# Patient Record
Sex: FEMALE | Race: BLACK OR AFRICAN AMERICAN | Employment: PART TIME | URBAN - METROPOLITAN AREA
[De-identification: names, ages, dates, MRNs, and addresses within clinical notes are randomized per-mention and may not be internally consistent; named-entity substitution may affect disease eponyms.]

---

## 2019-06-05 ENCOUNTER — OFFICE VISIT (OUTPATIENT)
Dept: FAMILY MEDICINE | Facility: CLINIC | Age: 44
End: 2019-06-05
Payer: COMMERCIAL

## 2019-06-05 VITALS
HEART RATE: 79 BPM | TEMPERATURE: 98.3 F | DIASTOLIC BLOOD PRESSURE: 68 MMHG | OXYGEN SATURATION: 98 % | WEIGHT: 192.6 LBS | BODY MASS INDEX: 32.09 KG/M2 | HEIGHT: 65 IN | SYSTOLIC BLOOD PRESSURE: 103 MMHG | RESPIRATION RATE: 16 BRPM

## 2019-06-05 DIAGNOSIS — N91.2 AMENORRHEA: Primary | ICD-10-CM

## 2019-06-05 RX ORDER — LEVOTHYROXINE SODIUM 25 UG/1
25 TABLET ORAL DAILY
COMMUNITY

## 2019-06-05 ASSESSMENT — MIFFLIN-ST. JEOR: SCORE: 1519.51

## 2019-06-05 NOTE — PATIENT INSTRUCTIONS
No periods for 2 to 3 yeas   Send you to women doctor   probably have menopause   follow after OB/GYN        OB/GYN REFERRAL  [facility]  Phone: [phone]  Fax: [phone]      [facility]  [address], [address]  [place] [address]      Appointment:Wednesday June 26, 2019     Arrival Time: 9:15 am  Provider:  Dr. Uriarte     Please bring a copy of your insurance card and photo ID    If you cannot make this appointment please call [phone] to reschedule    **HOLD FOR COMPLETED OV NOTE** Referral, demographics, labs and office note faxed to [phone].

## 2019-06-05 NOTE — PROGRESS NOTES
"S: Alejo Gonzalez is a 44 year old female who  States that had no periods fro 2 to 3 years/ sexually active without protection   No previus contraception   Last vaginal delivery 3  years ago   Last PAP/Normal by her report    Patients states that main concern today is referral to OB/GYN-amenorhea    PMHX/PSHX/MEDS/ALLERGIES/SHX/FHX reviewed and updated in Epic.  Blocked fallopian tube    ROS:  General: No fevers, chills  Head: No headache  Ears: No acute change in hearing.    CV: No chest pain or palpitations.  Resp: No shortness of breath.  No cough. No hemoptysis.  GI: No nausea, vomiting, constipation, diarrhea  : No urinary pains    O: /68   Pulse 79   Temp 98.3  F (36.8  C) (Oral)   Resp 16   Ht 1.651 m (5' 5\")   Wt 87.4 kg (192 lb 9.6 oz)   SpO2 98%   BMI 32.05 kg/m     Gen:  Well nourished and in NAD  HEENT: PERRLA; TMs normal color and landmarks; nasopharynx pink and moist; oropharynx pink and moist  Neck: supple without lymphadenopathy  CV:  RRR  - no murmurs, rubs, or gallups,   Pulm:  CTAB, no wheezes/rales/rhonchi, good air entry   ABD: soft, nontender, no masses, no rebound, BS intact throughout  Extrem: no cyanosis, edema or clubbing  Psych: Euthymic        Amenorrhea/mesntrual changes: probably menopause   Declines blood testing for menopuase or pregnancy detection today   OB/Gyn   Get  Medical records/was in [place] and now back in [place]    RTC after referralor sooner if develops new or worsening symptoms.    Oswaldo Onofre      "

## 2019-06-08 ENCOUNTER — HOSPITAL ENCOUNTER (EMERGENCY)
Facility: CLINIC | Age: 44
Discharge: HOME | End: 2019-06-08
Attending: FAMILY MEDICINE | Admitting: FAMILY MEDICINE
Payer: COMMERCIAL

## 2019-06-08 VITALS
RESPIRATION RATE: 14 BRPM | WEIGHT: 194 LBS | TEMPERATURE: 99 F | OXYGEN SATURATION: 95 % | DIASTOLIC BLOOD PRESSURE: 67 MMHG | BODY MASS INDEX: 32.28 KG/M2 | SYSTOLIC BLOOD PRESSURE: 111 MMHG | HEART RATE: 84 BPM

## 2019-06-08 DIAGNOSIS — J02.0 STREPTOCOCCAL SORE THROAT: ICD-10-CM

## 2019-06-08 LAB
DEPRECATED S PYO AG THROAT QL EIA: ABNORMAL
SPECIMEN SOURCE: ABNORMAL

## 2019-06-08 PROCEDURE — 87880 STREP A ASSAY W/OPTIC: CPT | Performed by: FAMILY MEDICINE

## 2019-06-08 PROCEDURE — 99284 EMERGENCY DEPT VISIT MOD MDM: CPT | Mod: 25 | Performed by: FAMILY MEDICINE

## 2019-06-08 PROCEDURE — 25000132 ZZH RX MED GY IP 250 OP 250 PS 637: Performed by: FAMILY MEDICINE

## 2019-06-08 PROCEDURE — 25000128 H RX IP 250 OP 636: Performed by: FAMILY MEDICINE

## 2019-06-08 PROCEDURE — 96372 THER/PROPH/DIAG INJ SC/IM: CPT | Performed by: FAMILY MEDICINE

## 2019-06-08 RX ORDER — IBUPROFEN 100 MG/5ML
600 SUSPENSION, ORAL (FINAL DOSE FORM) ORAL EVERY 6 HOURS PRN
Qty: 120 ML | Refills: 0 | Status: SHIPPED | OUTPATIENT
Start: 2019-06-08 | End: 2019-06-24

## 2019-06-08 RX ORDER — IBUPROFEN 100 MG/5ML
600 SUSPENSION, ORAL (FINAL DOSE FORM) ORAL ONCE
Status: COMPLETED | OUTPATIENT
Start: 2019-06-08 | End: 2019-06-08

## 2019-06-08 RX ORDER — IBUPROFEN 600 MG/1
600 TABLET, FILM COATED ORAL ONCE
Status: DISCONTINUED | OUTPATIENT
Start: 2019-06-08 | End: 2019-06-08

## 2019-06-08 RX ADMIN — PENICILLIN G BENZATHINE 2.4 MILLION UNITS: 2400000 INJECTION, SUSPENSION INTRAMUSCULAR at 15:07

## 2019-06-08 RX ADMIN — IBUPROFEN 600 MG: 100 SUSPENSION ORAL at 15:14

## 2019-06-08 ASSESSMENT — ENCOUNTER SYMPTOMS
DIAPHORESIS: 1
COUGH: 1
MYALGIAS: 1
VOMITING: 0
NAUSEA: 0
DIZZINESS: 1
TROUBLE SWALLOWING: 1
FEVER: 1
SHORTNESS OF BREATH: 0

## 2019-06-08 NOTE — DISCHARGE INSTRUCTIONS
Thank you for choosing [facility].     Please closely monitor for further symptoms. Return to the Emergency Department if you develop any new or worsening signs or symptoms.    If you received any opiate pain medications or sedatives during your visit, please do not drive for at least 8 hours.     Labs, cultures or final xray interpretations may still need to be reviewed.  We will call you if your plan of care needs to be changed.    Please follow up with your primary care physician or clinic.

## 2019-06-08 NOTE — ED AVS SNAPSHOT
[facility], [place], Emergency Department  [address]  [facility] [address]  Phone:  [phone]  Fax:  [phone]                                    Alejo Gonzalez   MRN: [id number]    Department:  [facility], Emergency Department   Date of Visit:  6/8/2019           After Visit Summary Signature Page    I have received my discharge instructions, and my questions have been answered. I have discussed any challenges I see with this plan with the nurse or doctor.    ..........................................................................................................................................  Patient/Patient Representative Signature      ..........................................................................................................................................  Patient Representative Print Name and Relationship to Patient    ..................................................               ................................................  Date                                   Time    ..........................................................................................................................................  Reviewed by Signature/Title    ...................................................              ..............................................  Date                                               Time          22EPIC Rev 08/18

## 2019-06-08 NOTE — ED PROVIDER NOTES
History     Chief Complaint   Patient presents with     Pharyngitis     ill since last jacky with rt ear and right side of throat pain     HPI  Alejo Gonzalez is a 44 year old female who presents to the ED for throat pain and difficulty swallowing. She began experiencing throat pain and difficulty swallowing at 9AM this morning (06/08/2019). She endorses a fever (at home, afebrile in the ED), dizziness, and sweating. She also endorses a cough, whole-body myalgias, and right ear discomfort. She attempted to alleviate her fever using Tylenol. Patient states her son has recently been sick with strep throat and a cough.      I have reviewed the Medications, Allergies, Past Medical and Surgical History, and Social History in the Epic system.    Review of Systems   Constitutional: Positive for diaphoresis and fever (at home, afebrile in the ED).   HENT: Positive for ear pain (discomfort) and trouble swallowing.    Respiratory: Positive for cough. Negative for shortness of breath.    Gastrointestinal: Negative for nausea and vomiting.   Musculoskeletal: Positive for myalgias (whole-body).   Neurological: Positive for dizziness.       Physical Exam   BP: 111/67  Pulse: 90  Temp: 98.2  F (36.8  C)  Resp: 16  Weight: 88 kg (194 lb)  SpO2: 94 %      Physical Exam   Constitutional: No distress.   HENT:   Head: Atraumatic.   Mouth/Throat: Uvula is midline and mucous membranes are normal. Oropharyngeal exudate, posterior oropharyngeal edema and posterior oropharyngeal erythema present. No tonsillar abscesses.   Eyes: Pupils are equal, round, and reactive to light. No scleral icterus.   Cardiovascular: Normal heart sounds and intact distal pulses.   Pulmonary/Chest: Breath sounds normal. No respiratory distress.   Abdominal: Soft. Bowel sounds are normal. There is no tenderness.   Musculoskeletal: She exhibits no edema or tenderness.   Lymphadenopathy:     She has cervical adenopathy (Anterior cervical chain).   Skin: Skin is  warm. No rash noted. She is not diaphoretic.       ED Course        Procedures             Critical Care time:  none             Labs Ordered and Resulted from Time of ED Arrival Up to the Time of Departure from the ED   RAPID STREP SCREEN - Abnormal; Notable for the following components:       Result Value    Rapid Strep A Screen   (*)     Value: POSITIVE: Group A Streptococcal antigen detected by immunoassay.    All other components within normal limits            Assessments & Plan (with Medical Decision Making)   Previously healthy 44-year-old woman presenting with 2-day history of severe sore throat fever and generalized malaise.  Initial differential diagnosis includes tonsillo-pharyngitis due to group A beta-hemolytic strep or other bacterial etiology, infectious mononucleosis, other viral pharyngitis, peritonsillar abscess, retropharyngeal abscess, Fady's angina, epiglottitis, thyroiditis, less likely meningitis, referred pain.  On exam, patient is afebrile, essentially normal vital signs.  The patient has evidence of pharyngitis.  There is no sign of Isis-tonsillar abscess, Retropharyngeal abscess, Fady's Angina, Epiglottitis, thyroid tenderness or Meningitis.  There is anterior cervical chain lymphadenopathy which is symmetric.  The remainder of her physical exam is unremarkable.  Her rapid strep test is positive.  She was able to take oral fluids in the ED as well as oral ibuprofen.  She received intramuscular benzathine penicillin.  Based on the clinical findings and the entire clinical scenario, the patient appears stable at this time to be treated symptomatically, and to follow-up as an outpatient for any further evaluation and treatment.  Discussed expected course, need for follow up, and indications for return with the patient.  See discharge instructions.      I have reviewed the nursing notes.    I have reviewed the findings, diagnosis, plan and need for follow up with the patient.        Medication List      Started    Benzocaine-Menthol 10-2.1 MG Lozg  1 lozenge, Mouth/Throat, 4 TIMES DAILY PRN     ibuprofen 100 MG/5ML suspension  Commonly known as:  ADVIL/MOTRIN  600 mg, Oral, EVERY 6 HOURS PRN            Final diagnoses:   Streptococcal sore throat     Gabriel PROCTOR, am serving as a trained medical scribe to document services personally performed by Ron Arroyo MD, based on the provider's statements to me.      IRon MD, was physically present and have reviewed and verified the accuracy of this note documented by Gabriel Paz.     6/8/2019   [facility], [place], EMERGENCY DEPARTMENT     Ron Arroyo MD  06/08/19 1350

## 2019-06-24 ENCOUNTER — OFFICE VISIT (OUTPATIENT)
Dept: FAMILY MEDICINE | Facility: CLINIC | Age: 44
End: 2019-06-24
Payer: COMMERCIAL

## 2019-06-24 VITALS
WEIGHT: 195 LBS | TEMPERATURE: 97.7 F | DIASTOLIC BLOOD PRESSURE: 73 MMHG | OXYGEN SATURATION: 98 % | HEART RATE: 71 BPM | SYSTOLIC BLOOD PRESSURE: 117 MMHG | RESPIRATION RATE: 16 BRPM | BODY MASS INDEX: 32.45 KG/M2

## 2019-06-24 DIAGNOSIS — R60.0 BILATERAL LEG EDEMA: Primary | ICD-10-CM

## 2019-06-24 DIAGNOSIS — B07.0 PLANTAR WARTS: ICD-10-CM

## 2019-06-24 DIAGNOSIS — R59.1 LA (LYMPHADENOPATHY): ICD-10-CM

## 2019-06-24 LAB
% GRANULOCYTES: 47.3 %G (ref 40–75)
ALBUMIN SERPL-MCNC: 4.4 MG/DL (ref 3.9–5.1)
ALP SERPL-CCNC: 81.3 U/L (ref 40–150)
ALT SERPL-CCNC: <15 U/L (ref 0–45)
AST SERPL-CCNC: 14 U/L (ref 0–45)
BILIRUB SERPL-MCNC: 0.3 MG/DL (ref 0.2–1.3)
BILIRUBIN UR: NEGATIVE
BLOOD UR: NEGATIVE
BUN SERPL-MCNC: 7.6 MG/DL (ref 7–19)
CALCIUM SERPL-MCNC: 9.2 MG/DL (ref 8.5–10.1)
CHLORIDE SERPLBLD-SCNC: 103.2 MMOL/L (ref 98–110)
CO2 SERPL-SCNC: 29.2 MMOL/L (ref 20–32)
CREAT SERPL-MCNC: 0.5 MG/DL (ref 0.5–1)
GFR SERPL CREATININE-BSD FRML MDRD: >90 ML/MIN/1.7 M2
GLUCOSE SERPL-MCNC: 93.6 MG'DL (ref 70–99)
GLUCOSE URINE: NEGATIVE
GRANULOCYTES #: 2.6 K/UL (ref 1.6–8.3)
HBA1C MFR BLD: 5.4 % (ref 4.1–5.7)
HCT VFR BLD AUTO: 38.6 % (ref 35–47)
HEMOGLOBIN: 11.5 G/DL (ref 11.7–15.7)
KETONES UR QL: NEGATIVE
LEUKOCYTE ESTERASE UR: ABNORMAL
LYMPHOCYTES # BLD AUTO: 2.5 K/UL (ref 0.8–5.3)
LYMPHOCYTES NFR BLD AUTO: 45 %L (ref 20–48)
MCH RBC QN AUTO: 26.1 PG (ref 26.5–35)
MCHC RBC AUTO-ENTMCNC: 29.8 G/DL (ref 32–36)
MCV RBC AUTO: 87.6 FL (ref 78–100)
MID #: 0.4 K/UL (ref 0–2.2)
MID %: 7.7 %M (ref 0–20)
NITRITE UR QL STRIP: NEGATIVE
PH UR STRIP: 7 [PH] (ref 4.5–8)
PLATELET # BLD AUTO: 189 K/UL (ref 150–450)
POTASSIUM SERPL-SCNC: 4.1 MMOL/DL (ref 3.2–4.6)
PROT SERPL-MCNC: 7.6 G/DL (ref 6.8–8.8)
PROTEIN UR: NEGATIVE
RBC # BLD AUTO: 4.4 M/UL (ref 3.8–5.2)
SODIUM SERPL-SCNC: 138 MMOL/L (ref 132–142)
SP GR UR STRIP: 1.02 (ref 1–1.03)
TSH SERPL DL<=0.05 MIU/L-ACNC: 1.69 UIU/ML (ref 0.3–5)
UROBILINOGEN UR STRIP-ACNC: ABNORMAL
WBC # BLD AUTO: 5.5 K/UL (ref 4–11)

## 2019-06-24 RX ORDER — OMEGA-3 FATTY ACIDS/FISH OIL 300-1000MG
400 CAPSULE ORAL EVERY 4 HOURS PRN
Qty: 30 CAPSULE | Refills: 0 | Status: SHIPPED | OUTPATIENT
Start: 2019-06-24 | End: 2020-06-02

## 2019-06-24 NOTE — PATIENT INSTRUCTIONS
Tylenol and ibuprofen for the throat pain and fevers.    We will call with lab results. If all is normal, you should get an echo. I will order this if needed.    [facility] Cardiologist   **HOLD FOR COMPLETED OV NOTE** Demographics, referral, office notes, medication list, EKG and labs faxed to [facility] at [phone] who will contact patient for scheduling.     [facility]  Phone: [phone]  Fax: [phone]    Faxed Referral over they will contact the patient. LONNIE Cole

## 2019-06-24 NOTE — PROGRESS NOTES
Chief Complaint   Patient presents with     Throat Pain     Pt is here for thorat pain x3-4 days               SUBJECTIVE       Alejo Gonzalez is a 44 year old  female with a PMH significant for:     Patient Active Problem List   Diagnosis     PTSD (post-traumatic stress disorder)     Female infertility- partially blocked Fallopian Tubes      She presents for evaluation of throat pain.  She notes that 2-3 weeks ago she had a throat swab, fever, pain, and received an injection. She notes that things did get better, but she is still having right sided throat pain. She notes it is worse at night. Ear is painful. Not having fever during the day, but does at night. Has a headache. Has abdominal pain. No cough or SOB. She also notes her ankles are swollen up to the mid shin just for 4-5 days. She does note she had this in the past, but it was gone for years. She states this hurts very much, and does not go down with sleeping.    Per chart review- on 6/8 she was seen at the ED, rapid strep test was positive and she received a shot of benzathine penicillin.    She additionally has many plantar warts she states are worsening on the right foot.    PMH, Medications and Allergies were reviewed with patient.        OBJECTIVE       Physical Exam:  /73   Pulse 71   Temp 97.7  F (36.5  C) (Oral)   Resp 16   Wt 88.5 kg (195 lb)   SpO2 98%   BMI 32.45 kg/m    Body mass index is 32.45 kg/m .      General: Adult female sitting on exam table, NAD  HEENT: PERRL, MMM.  Normal Conjunctiva, normal TMs bilaterally, no nasal drainage, no sinus tenderness, no pharyngeal erythema or tonsillar exudates.    Neck: Tender right sided cervical adenopathy and non tender left sided.  CV: RRR, no m/r/g  Pulm: CTAB, no wheezing, rhonchi or crackles  Ext: 1-2+ pitting edema bilaterally up to the mid shin. Right foot with multiple plantar warts on the right foot.       ASSESSMENT AND PLAN     Alejo was seen today for throat  pain.    Diagnoses and all orders for this visit:    Bilateral leg edema.  Patient with reported 5 days of bilateral lower extremity pitting edema up to the midshin who was diagnosed with strep pharyngitis a few weeks ago.  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  Unclear if this is an ongoing problem or truly new.  Will perform lab testing today of CBC, CMP, and urinalysis as well as hemoglobin A1c.  If these are all normal, would proceed with an echo.  Patient not having any shortness of breath or concern for PE, would also consider bilateral lower extremity ultrasounds for DVT.  -     CBC with Diff Plt ([facility])  -     Comprehensive Metabolic Panel ([facility])  -     Urinalysis ([facility])  -     Hemoglobin A1c ([facility])    LA (lymphadenopathy).  Patient with ongoing right sided tender adenopathy with left-sided adenopathy following streptococcal pharyngitis 6/8 for which she received benzathine penicillin shot.  Her tonsils today are nonedematous or erythematous and there are no exudate.  This is likely just resolving inflammatory response, recommend ibuprofen and Tylenol as well as honey.  Counseled patient that if things are worsening or if the swelling increases could consider imaging for possible abscess but her vital signs are within normal limits today.  -     ibuprofen (ADVIL/MOTRIN) 200 MG capsule; Take 2 capsules (400 mg) by mouth every 4 hours as needed for fever    Finally, patient with extensive plantar warts of her right foot, recommend that she return for a future visit and can consider intralesional candidal injection.  We will hold off on this today in case patient is immune suppressed.    I precepted with Dr. Jemima Martino MD  PGY-3, [facility] Medicine   Pager: [phone]

## 2019-06-24 NOTE — PROGRESS NOTES
Preceptor Attestation:   Patient seen, evaluated and discussed with the resident. I have verified the content of the note, which accurately reflects my assessment of the patient and the plan of care.   Supervising Physician:  Oswaldo Onofre MD

## 2019-06-26 ENCOUNTER — TRANSFERRED RECORDS (OUTPATIENT)
Dept: HEALTH INFORMATION MANAGEMENT | Facility: CLINIC | Age: 44
End: 2019-06-26

## 2019-07-02 ENCOUNTER — RECORDS - HEALTHEAST (OUTPATIENT)
Dept: ADMINISTRATIVE | Facility: OTHER | Age: 44
End: 2019-07-02

## 2019-07-15 ENCOUNTER — OFFICE VISIT (OUTPATIENT)
Dept: FAMILY MEDICINE | Facility: CLINIC | Age: 44
End: 2019-07-15
Payer: COMMERCIAL

## 2019-07-15 VITALS
BODY MASS INDEX: 32.75 KG/M2 | HEART RATE: 67 BPM | WEIGHT: 196.8 LBS | RESPIRATION RATE: 16 BRPM | SYSTOLIC BLOOD PRESSURE: 117 MMHG | DIASTOLIC BLOOD PRESSURE: 81 MMHG | OXYGEN SATURATION: 97 % | TEMPERATURE: 98.2 F

## 2019-07-15 DIAGNOSIS — S93.401D MODERATE RIGHT ANKLE SPRAIN, SUBSEQUENT ENCOUNTER: ICD-10-CM

## 2019-07-15 DIAGNOSIS — R35.0 URINARY FREQUENCY: Primary | ICD-10-CM

## 2019-07-15 DIAGNOSIS — S61.210A LACERATION OF RIGHT INDEX FINGER WITHOUT FOREIGN BODY WITHOUT DAMAGE TO NAIL, INITIAL ENCOUNTER: ICD-10-CM

## 2019-07-15 DIAGNOSIS — N39.41 URGE INCONTINENCE OF URINE: ICD-10-CM

## 2019-07-15 LAB
BACTERIA: NORMAL
BILIRUBIN UR: NEGATIVE
BLOOD UR: NEGATIVE
CASTS: NORMAL /LPF
CRYSTAL URINE: NORMAL /LPF
EPITHELIAL CELLS UR: <2 /LPF (ref 0–2)
GLUCOSE URINE: NEGATIVE
KETONES UR QL: NEGATIVE
LEUKOCYTE ESTERASE UR: ABNORMAL
MUCOUS URINE: NORMAL LPF
NITRITE UR QL STRIP: NEGATIVE
PH UR STRIP: 7 [PH] (ref 4.5–8)
PROTEIN UR: NEGATIVE
RBC URINE: NORMAL /HPF
SP GR UR STRIP: 1.02 (ref 1–1.03)
UROBILINOGEN UR STRIP-ACNC: ABNORMAL
WBC URINE: <2 /HPF

## 2019-07-15 RX ORDER — BACITRACIN ZINC 500 [USP'U]/G
OINTMENT TOPICAL 2 TIMES DAILY
Qty: 14 G | Refills: 0 | Status: SHIPPED | OUTPATIENT
Start: 2019-07-15 | End: 2020-06-09

## 2019-07-15 NOTE — PROGRESS NOTES
Preceptor attestation:  Vital signs reviewed: /81 (BP Location: Right arm, Patient Position: Sitting)   Pulse 67   Temp 98.2  F (36.8  C) (Oral)   Resp 16   Wt 89.3 kg (196 lb 12.8 oz)   SpO2 97%   BMI 32.75 kg/m      Patient seen, evaluated, and discussed with the resident.  I have verified the content of the note, which accurately reflects my assessment of the patient and the plan of care.    Supervising physician: Ana Luisa Davalos MD  [facility]

## 2019-07-15 NOTE — PATIENT INSTRUCTIONS
Jacobtflavio,     Use ankle brace to help support the injury ligaments.     Follow up with pelvic physical therapy. This will help with the incontinence    Use bacitracin on your injured finger.     Thank you,     Dr. Radha Ford    PHYSICAL THERAPY REFERRAL   July 17, 2019 Demographics and referral for Physical Therapy faxed to [facility] at [phone].  [facility]  Phone: [phone]  Fax: [phone]  Scheduling Hours: Monday - Friday, 7 am to 4:30 pm    [facility]  [address] [place] [address]    [facility]  [facility]   [address], [address]  [place] [address]    [facility]  [facility]  [address] [place] [address]    [facility]  [address], [address]  [place] [address]    [facility]  [address] [place] [address]

## 2019-07-15 NOTE — PROGRESS NOTES
[facility] Clinic         SUBJECTIVE       Alejo Gonzalez is a 44 year old female with a PMH of PTSD presenting to clinic today with several concerns.    Back pain/urinary incontinence:  Patient comes in today with 3 days of back pain with urinary symptoms.  She describes it as an urge incontinence.  She notes that she can feel that she has to urinate however she just cannot quite make it to the bathroom in time.  This has been going on and off for several years it is just worsened over the last 3 days.  She notes that when this happens her back does start to bother her a little bit.  She does not denies any numbness or tingling down her legs.  She has no saddle anesthesia or bowel or bladder incontinence that she cannot feel before it happens.    Finger Pain: Patient slammed her index finger finger in a car door 3 days ago and she is concerned that it could become infected.  Finger is wrapped in a Band-Aid and she has not taken the bandage off since the initial injury.  No numbness or tingling in the finger.  Good range of motion.  Able to pick stuff up and  fine with that hand.    Right ankle injury: Patient was seen in the emergency department on 7/7/2019 for right ankle injury.  X-rays at that time were negative for any acute fracture.  She does have some significant swelling around the area.  She is having a difficult time walking on it.  She says she has difficult time walking on it as it feels slightly unstable.  Mechanism of injury was rolling her right ankle into inversion.  She denies any numbness or tingling in the foot.  She is good pulses in her feet are not extremely cold.  She was instructed to take Tylenol and ibuprofen in the emergency department.  She notes that these are somewhat helping however she is concerned as her pain is still present.    PMH, Medications and Allergies were reviewed and updated as needed.    ROS:  General: No fevers, chills  Head: No headache  Ears: No  acute change in hearing.    CV: No chest pain or palpitations.  Resp: No shortness of breath.  No cough. No hemoptysis.  GI: No nausea, vomiting, constipation, diarrhea  : No urinary pains. No dysuria    Patient Active Problem List   Diagnosis     PTSD (post-traumatic stress disorder)     Female infertility- partially blocked Fallopian Tubes      Plantar warts       Current Outpatient Medications   Medication Sig Dispense Refill     bacitracin 500 UNIT/GM external ointment Apply topically 2 times daily Apply to finger laceration. 14 g 0     ibuprofen (ADVIL/MOTRIN) 200 MG capsule Take 2 capsules (400 mg) by mouth every 4 hours as needed for fever 30 capsule 0     levothyroxine (SYNTHROID/LEVOTHROID) 25 MCG tablet Take 25 mcg by mouth daily              OBJECTIVE:       Vitals:   Vitals:    07/15/19 1130   BP: 117/81   BP Location: Right arm   Patient Position: Sitting   Pulse: 67   Resp: 16   Temp: 98.2  F (36.8  C)   TempSrc: Oral   SpO2: 97%   Weight: 89.3 kg (196 lb 12.8 oz)     BMI: Body mass index is 32.75 kg/m .    Gen:  Well nourished and in no acute distress  HEENT: Extraocular movement intact.  Neck: supple without lymphadenopathy  CV:  RRR  - no murmurs noted   Pulm:  CTAB, no wheezes or crackles noted, good air entry   ABD: soft, nontender, no masses, no rebound, BS intact throughout, no hepatosplenomegaly  Extrem: Mild bruising around the lateral right malleolus. Tenderness to palpation over ATFL. Pain with inversion test.  1.5 linear laceration on caba surface of left index finger. Good sensation.   Psych: Euthymic           ASSESSMENT and PLAN:     1. Urinary frequency  - Urinalysis, Micro If ([facility])  - Urine Microscopic ([facility])    2. Laceration of right index finger without foreign body without damage to nail, initial encounter: Finger laceration small in size. Bleeding has stopped. Given bacitracin to help prevent any infection.  - bacitracin 500 UNIT/GM external ointment; Apply topically 2  times daily Apply to finger laceration.  Dispense: 14 g; Refill: 0    3. Urge incontinence of urine: UA is negative for concerns of infection. Patient symptoms most consistent with urge incontinence. Referred to pelvic floor physical therapy.   - PHYSICAL THERAPY REFERRAL; Future    4. Moderate right ankle sprain, subsequent encounter  - Lace-up Ankle Brace      Return to clinic as needed for follow up. Return sooner if develops new or worsening symptoms.    Options for treatment and/or follow-up care were reviewed with the patient was actively involved in the decision making process. Patient verbalized understanding and was in agreement with the plan.    The patient was seen by and discussed with MD Radha Goodwin DO PGY 2  [facility]  [phone]

## 2019-07-18 ENCOUNTER — AMBULATORY - HEALTHEAST (OUTPATIENT)
Dept: ADMINISTRATIVE | Facility: REHABILITATION | Age: 44
End: 2019-07-18

## 2019-07-18 DIAGNOSIS — N39.41 URGE INCONTINENCE OF URINE: ICD-10-CM

## 2020-05-05 ENCOUNTER — TELEPHONE (OUTPATIENT)
Dept: FAMILY MEDICINE | Facility: CLINIC | Age: 45
End: 2020-05-05

## 2020-05-05 NOTE — TELEPHONE ENCOUNTER
Reached out to patient during COVID19 Clinic outreach. Reassured patient that [facility] is still open and has started implementing phone and video appointments to help patient remain safe at home.     Patient reports the following concerns: No concern        Jaime Gee, CMA

## 2020-05-28 ENCOUNTER — HOSPITAL ENCOUNTER (EMERGENCY)
Facility: CLINIC | Age: 45
Discharge: HOME | End: 2020-05-28
Attending: PHYSICIAN ASSISTANT | Admitting: PHYSICIAN ASSISTANT
Payer: COMMERCIAL

## 2020-05-28 ENCOUNTER — APPOINTMENT (OUTPATIENT)
Dept: GENERAL RADIOLOGY | Facility: CLINIC | Age: 45
End: 2020-05-28
Attending: PHYSICIAN ASSISTANT
Payer: COMMERCIAL

## 2020-05-28 ENCOUNTER — APPOINTMENT (OUTPATIENT)
Dept: CT IMAGING | Facility: CLINIC | Age: 45
End: 2020-05-28
Attending: PHYSICIAN ASSISTANT
Payer: COMMERCIAL

## 2020-05-28 ENCOUNTER — TELEPHONE (OUTPATIENT)
Dept: FAMILY MEDICINE | Facility: CLINIC | Age: 45
End: 2020-05-28

## 2020-05-28 VITALS
RESPIRATION RATE: 16 BRPM | HEIGHT: 65 IN | WEIGHT: 184 LBS | BODY MASS INDEX: 30.66 KG/M2 | DIASTOLIC BLOOD PRESSURE: 79 MMHG | TEMPERATURE: 97.2 F | SYSTOLIC BLOOD PRESSURE: 121 MMHG | HEART RATE: 71 BPM | OXYGEN SATURATION: 100 %

## 2020-05-28 DIAGNOSIS — S46.911A STRAIN OF RIGHT SHOULDER, INITIAL ENCOUNTER: ICD-10-CM

## 2020-05-28 DIAGNOSIS — S00.03XA CONTUSION OF SCALP, INITIAL ENCOUNTER: ICD-10-CM

## 2020-05-28 DIAGNOSIS — Z65.3 LEGAL STATUS: Primary | ICD-10-CM

## 2020-05-28 DIAGNOSIS — S16.1XXA NECK STRAIN, INITIAL ENCOUNTER: ICD-10-CM
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
## 2020-05-28 LAB — B-HCG FREE SERPL-ACNC: <5 IU/L

## 2020-05-28 PROCEDURE — 73030 X-RAY EXAM OF SHOULDER: CPT | Mod: RT

## 2020-05-28 PROCEDURE — 99285 EMERGENCY DEPT VISIT HI MDM: CPT | Mod: 25

## 2020-05-28 PROCEDURE — 70450 CT HEAD/BRAIN W/O DYE: CPT

## 2020-05-28 PROCEDURE — 25000132 ZZH RX MED GY IP 250 OP 250 PS 637: Performed by: EMERGENCY MEDICINE

## 2020-05-28 PROCEDURE — 25000132 ZZH RX MED GY IP 250 OP 250 PS 637: Performed by: PHYSICIAN ASSISTANT

## 2020-05-28 PROCEDURE — 84702 CHORIONIC GONADOTROPIN TEST: CPT

## 2020-05-28 RX ORDER — CYCLOBENZAPRINE HCL 10 MG
10 TABLET ORAL 3 TIMES DAILY PRN
Qty: 15 TABLET | Refills: 0 | Status: SHIPPED | OUTPATIENT
Start: 2020-05-28 | End: 2020-06-09

## 2020-05-28 RX ORDER — ACETAMINOPHEN 325 MG/1
650 TABLET ORAL ONCE
Status: COMPLETED | OUTPATIENT
Start: 2020-05-28 | End: 2020-05-28

## 2020-05-28 RX ORDER — CYCLOBENZAPRINE HCL 10 MG
10 TABLET ORAL ONCE
Status: COMPLETED | OUTPATIENT
Start: 2020-05-28 | End: 2020-05-28

## 2020-05-28 RX ADMIN — CYCLOBENZAPRINE 10 MG: 10 TABLET, FILM COATED ORAL at 19:01

## 2020-05-28 RX ADMIN — ACETAMINOPHEN 650 MG: 325 TABLET, FILM COATED ORAL at 14:46

## 2020-05-28 ASSESSMENT — MIFFLIN-ST. JEOR: SCORE: 1475.5

## 2020-05-28 ASSESSMENT — ENCOUNTER SYMPTOMS: VOMITING: 0

## 2020-05-28 NOTE — ED AVS SNAPSHOT
Emergency Department  [address] [place] [address]  Phone:  [phone]  Fax:  [phone]                                    Alejo Gonzalez   MRN: [id number]    Department:   Emergency Department   Date of Visit:  5/28/2020           After Visit Summary Signature Page    I have received my discharge instructions, and my questions have been answered. I have discussed any challenges I see with this plan with the nurse or doctor.    ..........................................................................................................................................  Patient/Patient Representative Signature      ..........................................................................................................................................  Patient Representative Print Name and Relationship to Patient    ..................................................               ................................................  Date                                   Time    ..........................................................................................................................................  Reviewed by Signature/Title    ...................................................              ..............................................  Date                                               Time          22EPIC Rev 08/18

## 2020-05-28 NOTE — TELEPHONE ENCOUNTER
[facility] phone call message- general phone call:    Reason for call:     Pt would like to speak to someone. She states its an emergency    Action desired:     Call back    Return call needed: Yes    OK to leave a message on voice mail? Yes    Advised patient to response may take up to 2 business days: Yes    Primary language: English      needed? No    Call taken on May 28, 2020 at 1:12 PM by Jenny White CMA
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
## 2020-06-01 ENCOUNTER — VIRTUAL VISIT (OUTPATIENT)
Dept: FAMILY MEDICINE | Facility: CLINIC | Age: 45
End: 2020-06-01
Payer: COMMERCIAL

## 2020-06-01 VITALS — BODY MASS INDEX: 30.66 KG/M2 | HEIGHT: 65 IN | WEIGHT: 184 LBS
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
## 2020-06-01 DIAGNOSIS — R52 BODY ACHES: Primary | ICD-10-CM

## 2020-06-01 DIAGNOSIS — R59.1 LA (LYMPHADENOPATHY): ICD-10-CM

## 2020-06-01 ASSESSMENT — MIFFLIN-ST. JEOR: SCORE: 1475.5
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
## 2020-06-02 RX ORDER — OMEGA-3 FATTY ACIDS/FISH OIL 300-1000MG
CAPSULE ORAL
Qty: 30 CAPSULE | Refills: 0 | Status: SHIPPED | OUTPATIENT
Start: 2020-06-02

## 2020-06-08 NOTE — TELEPHONE ENCOUNTER
June 8, 2020   Legal Services Referral - [place] only  Legal referral has been sent to Yusra Burgos from [facility].     Scan/Send demographics and referral to [email or web address]    She will review, advise, and contact the patient.     Karina Noel

## 2020-06-08 NOTE — TELEPHONE ENCOUNTER
HELENE called Alejo to f/u on her getting her ID renewed. She states that she is aware on how to renew her state ID and plans to go to the local [facility] to do so in the coming days.     Her dilemma is her social security card and green card. Alejo goes on to say that she has applied for citizenship and completed the interview. Her ceremony was rescheduled due to COVID-19 pandemic. Her ceremony is now scheduled for 06/26/2020. She is worried because she lost her green card and she knows that's required for the ceremony. She has a copy of her green card and is wondering if that will suffice for the citizenship ceremony. HELENE agreed to take this situation to [facility]  to assist.     HELENE discussed getting there social security card replaced. Due to the local social security offices being closed for in person services, there is an online option. She would need to create an account and go through the steps. Alejo feels comfortable doing so. HELENE will email her the social security link to begin that process.     Referral entered for [facility] to discuss green card issues with pending citizenship ceremony coming up on 06/26/2020.    MAURICE Vegas

## 2020-06-08 NOTE — TELEPHONE ENCOUNTER
HELENE attempted outreach to 's listed phone #'s. Unable to connect with her. Did not leave a VM.     Alejo was requesting assistance with getting her lost [place] replaced. HELENE mailed her the information to contact her local DMV office, [place], to schedule an appointment. Otherwise she can go to a walk in at a different DMV location. There is a pre application for her to complete online to make the process quick, she can also request an expedited I.D for an additional $20 charge.     Mailed letter to Alejo on this date.     MAURICE Vegas

## 2020-06-09 PROBLEM — Z91.81 HISTORY OF FALLING: Status: ACTIVE | Noted: 2018-10-09

## 2020-06-09 PROBLEM — Z78.0 MENOPAUSE PRESENT: Status: ACTIVE | Noted: 2017-09-01

## 2020-06-09 PROBLEM — M25.519 SHOULDER PAIN: Status: ACTIVE | Noted: 2020-06-09

## 2020-06-09 RX ORDER — ACETAMINOPHEN 500 MG
500-1000 TABLET ORAL EVERY 6 HOURS PRN
Qty: 60 TABLET | Refills: 1 | Status: SHIPPED | OUTPATIENT
Start: 2020-06-09

## 2020-06-11 NOTE — PROGRESS NOTES
Preceptor Attestation:    I talked to the patient on the phone and discussed the patient with the resident. I have verified the content of the note, which accurately reflects my assessment of the patient and the plan of care.   Supervising Physician:  Nate Smith MD.

## 2024-11-02 ENCOUNTER — NURSE TRIAGE (OUTPATIENT)
Dept: NURSING | Facility: CLINIC | Age: 49
End: 2024-11-02
Payer: COMMERCIAL

## 2024-11-02 NOTE — TELEPHONE ENCOUNTER
Nurse Triage SBAR    Situation: Triage call for vertigo and headache.     Background: Symptoms started suddenly at 5AM this morning.     PCP is with [facility].     Assessment: Vertigo and headache started at 5AM today.   Ibuprofen helped with headache, currently moderate level intensity.     When she stands up, she feels like she will fall d/t dizziness.     Protocol Recommended Disposition:   ED or PCP triage. Since PCP is not with [facility], unable to page for 2LT. Advised ED. Patient verbalized understanding and had no further questions.      Audrey Briceño RN  [facility] Nurse Advisor  11/2/2024 11:28 AM       Reason for Disposition   SEVERE dizziness (vertigo) (e.g., unable to walk without assistance)    Additional Information   Negative: [1] Weakness (i.e., paralysis, loss of muscle strength) of the face, arm or leg on one side of the body AND [2] sudden onset AND [3] present now   Negative: [1] Numbness (i.e., loss of sensation) of the face, arm or leg on one side of the body AND [2] sudden onset AND [3] present now   Negative: [1] Loss of speech or garbled speech AND [2] sudden onset AND [3] present now   Negative: Difficult to awaken or acting confused (e.g., disoriented, slurred speech)   Negative: Sounds like a life-threatening emergency to the triager   Negative: Followed a head injury   Negative: Followed an ear injury   Negative: Localized weakness or numbness is main symptom   Negative: Dizziness relates to riding in a car, going to an amusement park, etc.   Negative: [1] Dizziness is main symptom AND [2] NO spinning sensation (i.e., vertigo)    Protocols used: Dizziness - Vertigo-A-